# Patient Record
Sex: FEMALE | Race: WHITE | Employment: UNEMPLOYED | URBAN - NONMETROPOLITAN AREA
[De-identification: names, ages, dates, MRNs, and addresses within clinical notes are randomized per-mention and may not be internally consistent; named-entity substitution may affect disease eponyms.]

---
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
## 2020-01-04 ENCOUNTER — HOSPITAL ENCOUNTER (EMERGENCY)
Age: 20
Discharge: HOME | End: 2020-01-04
Attending: EMERGENCY MEDICINE
Payer: COMMERCIAL

## 2020-01-04 VITALS
OXYGEN SATURATION: 99 % | BODY MASS INDEX: 33.75 KG/M2 | SYSTOLIC BLOOD PRESSURE: 112 MMHG | WEIGHT: 210 LBS | HEIGHT: 66 IN | RESPIRATION RATE: 16 BRPM | HEART RATE: 81 BPM | TEMPERATURE: 98.4 F | DIASTOLIC BLOOD PRESSURE: 81 MMHG

## 2020-01-04 PROCEDURE — 99283 EMERGENCY DEPT VISIT LOW MDM: CPT

## 2020-01-04 RX ORDER — SULFAMETHOXAZOLE AND TRIMETHOPRIM 800; 160 MG/1; MG/1
1 TABLET ORAL 2 TIMES DAILY
Qty: 20 TABLET | Refills: 0 | Status: SHIPPED | OUTPATIENT
Start: 2020-01-04 | End: 2020-01-14

## 2020-01-04 SDOH — HEALTH STABILITY: MENTAL HEALTH: HOW OFTEN DO YOU HAVE A DRINK CONTAINING ALCOHOL?: NEVER

## 2020-01-04 ASSESSMENT — PAIN DESCRIPTION - DESCRIPTORS: DESCRIPTORS: SHARP;ACHING;DISCOMFORT

## 2020-01-04 ASSESSMENT — PAIN DESCRIPTION - ORIENTATION: ORIENTATION: LEFT

## 2020-01-04 ASSESSMENT — PAIN DESCRIPTION - LOCATION: LOCATION: PERINEUM

## 2020-01-04 ASSESSMENT — PAIN DESCRIPTION - PAIN TYPE: TYPE: ACUTE PAIN

## 2020-01-04 ASSESSMENT — PAIN DESCRIPTION - PROGRESSION: CLINICAL_PROGRESSION: GRADUALLY WORSENING

## 2020-01-04 ASSESSMENT — PAIN - FUNCTIONAL ASSESSMENT: PAIN_FUNCTIONAL_ASSESSMENT: PREVENTS OR INTERFERES SOME ACTIVE ACTIVITIES AND ADLS

## 2020-01-04 ASSESSMENT — PAIN DESCRIPTION - ONSET: ONSET: PROGRESSIVE

## 2020-01-04 ASSESSMENT — PAIN DESCRIPTION - FREQUENCY: FREQUENCY: INTERMITTENT

## 2020-01-04 ASSESSMENT — PAIN SCALES - GENERAL: PAINLEVEL_OUTOF10: 5

## 2020-01-04 NOTE — ED PROVIDER NOTES
Emergency Department Encounter  Location: [facility] [address] [place] [address]    Patient: Elias Monroe  MRN: [id number]  : 2000  Date of evaluation: 2020  ED Provider: Mary Gregory DO, FACEP    Chief Complaint:    Sore (Patient has had sore on left outer pubic area for the last two months. Pain has gotten worse in the last couple days. )    [place]:  Elias Monroe is a 20 y.o. female that presents to the emergency department with complaints of a knot in her left groin. The patient states there was a small knot that is been present there for about a month. Today she noticed that there was some irritation against her underwear. She noticed that now there is a little hole there where there used to be a knot. She states it has not drained out any material.  She states it is mildly painful at 5 out of 10. She denies any drainage from this area. She denies fever or chills or nausea or vomiting. ROS:  At least 4 systems reviewed and otherwise acutely negative except as in the [address] Past Medical History:   Diagnosis Date    Seasonal allergies      History reviewed. No pertinent surgical history. History reviewed. No pertinent family history.   Social History     Socioeconomic History    Marital status: Single     Spouse name: Not on file    Number of children: Not on file    Years of education: Not on file    Highest education level: Not on file   Occupational History    Not on file   Social Needs    Financial resource strain: Not on file    Food insecurity:     Worry: Not on file     Inability: Not on file    Transportation needs:     Medical: Not on file     Non-medical: Not on file   Tobacco Use    Smoking status: Never Smoker    Smokeless tobacco: Never Used   Substance and Sexual Activity    Alcohol use: Never     Frequency: Never    Drug use: Never    Sexual activity: Yes     Partners: Male   Lifestyle    Physical activity:     Days per week: Not on file     Minutes per session:

## 2020-11-29 ENCOUNTER — HOSPITAL ENCOUNTER (EMERGENCY)
Age: 20
Discharge: HOME | End: 2020-11-29
Attending: EMERGENCY MEDICINE
Payer: COMMERCIAL

## 2020-11-29 ENCOUNTER — APPOINTMENT (OUTPATIENT)
Dept: ULTRASOUND IMAGING | Age: 20
End: 2020-11-29
Payer: COMMERCIAL

## 2020-11-29 VITALS
WEIGHT: 190 LBS | BODY MASS INDEX: 31.65 KG/M2 | HEIGHT: 65 IN | SYSTOLIC BLOOD PRESSURE: 125 MMHG | HEART RATE: 78 BPM | TEMPERATURE: 98 F | OXYGEN SATURATION: 100 % | DIASTOLIC BLOOD PRESSURE: 84 MMHG | RESPIRATION RATE: 16 BRPM

## 2020-11-29 LAB
ALBUMIN SERPL-MCNC: 4.4 GM/DL (ref 3.4–5)
ALP BLD-CCNC: 77 IU/L (ref 40–129)
ALT SERPL-CCNC: 13 U/L (ref 10–40)
ANION GAP SERPL CALCULATED.3IONS-SCNC: 11 MMOL/L (ref 4–16)
AST SERPL-CCNC: 15 IU/L (ref 15–37)
BACTERIA: ABNORMAL /HPF
BASOPHILS ABSOLUTE: 0 K/CU MM
BASOPHILS RELATIVE PERCENT: 0.3 % (ref 0–1)
BILIRUB SERPL-MCNC: 0.4 MG/DL (ref 0–1)
BILIRUBIN URINE: NEGATIVE MG/DL
BLOOD, URINE: ABNORMAL
BUN BLDV-MCNC: 13 MG/DL (ref 6–23)
CALCIUM SERPL-MCNC: 9.2 MG/DL (ref 8.3–10.6)
CAST TYPE: NEGATIVE /HPF
CHLORIDE BLD-SCNC: 103 MMOL/L (ref 99–110)
CLARITY: ABNORMAL
CO2: 24 MMOL/L (ref 21–32)
COLOR: YELLOW
CREAT SERPL-MCNC: 0.8 MG/DL (ref 0.6–1.1)
CRYSTAL TYPE: NEGATIVE /HPF
DIFFERENTIAL TYPE: ABNORMAL
EOSINOPHILS ABSOLUTE: 0.1 K/CU MM
EOSINOPHILS RELATIVE PERCENT: 1 % (ref 0–3)
EPITHELIAL CELLS, UA: ABNORMAL /HPF
GFR AFRICAN AMERICAN: >60 ML/MIN/1.73M2
GFR NON-AFRICAN AMERICAN: >60 ML/MIN/1.73M2
GLUCOSE BLD-MCNC: 122 MG/DL (ref 70–99)
GLUCOSE, URINE: NEGATIVE MG/DL
GONADOTROPIN, CHORIONIC (HCG) QUANT: 102.9 UIU/ML
HCT VFR BLD CALC: 38.2 % (ref 37–47)
HEMOGLOBIN: 12.5 GM/DL (ref 12.5–16)
IMMATURE NEUTROPHIL %: 0.4 % (ref 0–0.43)
INTERPRETATION: ABNORMAL
KETONES, URINE: NEGATIVE MG/DL
LEUKOCYTE ESTERASE, URINE: NEGATIVE
LIPASE: 16 IU/L (ref 13–60)
LYMPHOCYTES ABSOLUTE: 2.5 K/CU MM
LYMPHOCYTES RELATIVE PERCENT: 26.5 % (ref 24–44)
MCH RBC QN AUTO: 29.6 PG (ref 27–31)
MCHC RBC AUTO-ENTMCNC: 32.7 % (ref 32–36)
MCV RBC AUTO: 90.5 FL (ref 78–100)
MONOCYTES ABSOLUTE: 0.4 K/CU MM
MONOCYTES RELATIVE PERCENT: 4.5 % (ref 0–4)
NITRITE URINE, QUANTITATIVE: NEGATIVE
PDW BLD-RTO: 12.6 % (ref 11.7–14.9)
PH, URINE: 5.5 (ref 5–8)
PLATELET # BLD: 357 K/CU MM (ref 140–440)
PMV BLD AUTO: 9.5 FL (ref 7.5–11.1)
POTASSIUM SERPL-SCNC: 3.8 MMOL/L (ref 3.5–5.1)
PREGNANCY, URINE: POSITIVE
PROTEIN UA: NEGATIVE MG/DL
RBC # BLD: 4.22 M/CU MM (ref 4.2–5.4)
RBC URINE: ABNORMAL /HPF (ref 0–6)
SEGMENTED NEUTROPHILS ABSOLUTE COUNT: 6.3 K/CU MM
SEGMENTED NEUTROPHILS RELATIVE PERCENT: 67.3 % (ref 36–66)
SODIUM BLD-SCNC: 138 MMOL/L (ref 135–145)
SPECIFIC GRAVITY UA: 1.03 (ref 1–1.03)
SPECIFIC GRAVITY, URINE: 1.03 (ref 1–1.03)
TOTAL IMMATURE NEUTOROPHIL: 0.04 K/CU MM
TOTAL PROTEIN: 7 GM/DL (ref 6.4–8.2)
UROBILINOGEN, URINE: 0.2 MG/DL (ref 0.2–1)
WBC # BLD: 9.3 K/CU MM (ref 4–10.5)
WBC UA: NEGATIVE /HPF (ref 0–5)

## 2020-11-29 PROCEDURE — 80053 COMPREHEN METABOLIC PANEL: CPT

## 2020-11-29 PROCEDURE — 84702 CHORIONIC GONADOTROPIN TEST: CPT

## 2020-11-29 PROCEDURE — 93975 VASCULAR STUDY: CPT

## 2020-11-29 PROCEDURE — 81001 URINALYSIS AUTO W/SCOPE: CPT

## 2020-11-29 PROCEDURE — 81025 URINE PREGNANCY TEST: CPT

## 2020-11-29 PROCEDURE — 99283 EMERGENCY DEPT VISIT LOW MDM: CPT

## 2020-11-29 PROCEDURE — 76817 TRANSVAGINAL US OBSTETRIC: CPT

## 2020-11-29 PROCEDURE — 85025 COMPLETE CBC W/AUTO DIFF WBC: CPT

## 2020-11-29 PROCEDURE — 83690 ASSAY OF LIPASE: CPT

## 2020-11-29 ASSESSMENT — PAIN DESCRIPTION - ORIENTATION: ORIENTATION: LOWER;LEFT

## 2020-11-29 ASSESSMENT — PAIN DESCRIPTION - LOCATION: LOCATION: ABDOMEN

## 2020-11-29 ASSESSMENT — PAIN SCALES - GENERAL: PAINLEVEL_OUTOF10: 10

## 2020-11-29 ASSESSMENT — PAIN DESCRIPTION - PAIN TYPE: TYPE: ACUTE PAIN

## 2020-11-29 ASSESSMENT — PAIN DESCRIPTION - DESCRIPTORS: DESCRIPTORS: CRAMPING

## 2020-11-29 NOTE — ED NOTES
Patient to [address] via wheelchair.      Kendall Jonesster, RN  11/29/20 7338

## 2020-11-29 NOTE — ED NOTES
This nurse to resume care of patient. Patient currently waiting on [facility] to arrive to complete US. Patient laying on right fanny in bed resting, states she is still having cramping on her left side, unchanged since arriving to the ED. Patient provided a warm blanket, no other concerns or needs expressed at this time.      Iain Villarreal RN  11/29/20 5519

## 2020-11-29 NOTE — ED PROVIDER NOTES
Emergency Department Encounter    Patient: Fauzia Duffy  MRN: [id number]  : 2000  Date of Evaluation: 2020  ED Provider:  LIBERTY BOB    Triage Chief Complaint:   Abdominal Pain (Patient states that she woke up this morning vomiting. She states that she has had abdominal pain in her lower left adomen. She states that her and her  have been trying to get pregnant and there is some blood when she wipes. States that she just got off her period 4 days ago. )      [place]:  Fauzia Duffy is a 23 y.o. female that presents to the emergency department with left lower quadrant abdominal pain beginning around 5 AM today. This is cramping in quality and felt like \"gas. \"  Pain has waxed and waned since then. She began to feel very nauseated and did vomit. She has noted a little blood when she wipes but denies any dysuria or visible hematuria otherwise. She denies diarrhea or constipation. Her LMP ended 4 days ago, but she and her significant other are \"trying\" to get pregnant. Patient denies other upper respiratory symptoms or fevers. Patient is currently  1 para 1 with an approximate 25month-old daughter at home. Patient reports no other particular provocative or alleviating factors. ROS - see HPI, below listed is current ROS at time of my eval:  CONSTITUTIONAL: No fevers, chills, or sweats. EYES: No vision change, redness, drainage, or discharge. HENT: No sore throat, runny nose, or earache. No dental pain. No painful swallowing. RESPIRATORY: No difficulty breathing, cough, or sputum production. CARDIOVASCULAR: No anginal-type chest pain, orthopnea, or edema. GASTROINTESTINAL: No diarrhea or constipation. No hematemesis, hematochezia, or melena. GENITOURINARY: No frequency, urgency, or dysuria. No hematuria. MUSCULOSKELETAL: No recent injury. No neck, back, or extremity pain. NEUROLOGICAL: No focal weakness, numbness, or tingling. SKIN: No rashes or other lesions reported.   No Peak Flow       Pain Score       Pain Loc       Pain Edu? Excl. in [address]? My pulse ox interpretation is - normal    GENERAL: Patient is awake, alert, and oriented appropriately. Patient is resting comfortably in a still position on the exam table. Patient speaking in full and complete sentences. Well-nourished and well-developed. HEENT: Normocephalic and atraumatic. No midface, zygomatic, maxillary, or mandibular tenderness. No dental malocclusion. Bilateral external ears are unremarkable. Tympanic membranes are pearly and gray without visible effusion or retraction. Nasal mucosa is pink without purulence. Oral mucosa is moist and pink. There is no significant tonsillar enlargement or exudate. Uvula midline. There is no elevation of the tongue or pooling of secretions. NECK: Supple without Kernig's or Brudzinski signs. No significant lymphadenopathy or limitation range of motion. No midline spinal tenderness. RESPIRATORY: Symmetric aeration bilaterally. No audible wheezes, rales, rhonchi, or stridor. No chest wall tenderness. CARDIOVASCULAR: Regular rate and rhythm. No audible murmurs, rubs, or gallops. No central or peripheral cyanosis. GASTROINTESTINAL: Soft, nontender, and nondistended. No McBurney's or Tim's point tenderness. No guarding, rebound, rigidity. No mass or pulsatile mass. Bowel sounds are present in all quadrants. No costovertebral angle tenderness. NEUROLOGICAL: Cranial nerves III through XII are grossly intact as tested without facial droop or dermatomal paresthesias. Of note, forehead wrinkles are symmetric and intact. Conjugate gaze without entrapment. No asymmetry of the corners of the mouth or nasolabial folds. No gross motor or cerebellar deficits. MUSCULOSKELETAL: No asymmetric edema, Homans' sign, or cords. SKIN: Normal tone for ethnicity. Normal turgor and brisk capillary refill peripherally.   No petechiae, purpura, vesicles, bullae, or other lesions. No icterus. PSYCHIATRIC: Normal mood. Normal affect. No voiced suicidal or homicidal ideation. Patient does not respond to internal stimuli. Emergency department course. Patient is brought to bed 3 and assessed and reassessed by me. Prior to initial evaluation, initial orders are placed for CBC, metabolic panel, lipase, and urinalysis with pregnancy. After initial evaluation, screening studies are pending. We will likely need ultrasound or CT scan based upon the results of the pregnancy test.  Abdomen is nonsurgical at this time. Patient is agreeable to continuing plan. As of approximately , patient is notified of the positive pregnancy test.  A quantitative hCG and ultrasound of the pelvis are ordered. Patient is agreeable to the additional testing. Upon most recent reevaluation, patient remains clinically stable. Abdomen has been nonsurgical.  Quantitative hCG is fairly low at 102.9, and pelvic ultrasound does not show detectable products of conception. Fortunately, there is no evidence of ectopic or heterotopic pregnancy. We have discussed that this may be due to a very early gestation. As such, we have discussed repeat quantitative hCG level in about 48 hours and careful follow-up with her OB/GYN. We have discussed the possibility of miscarriage as possible explanations for the low level and ultrasound findings, as well. Evaluation does not suggest high risk of appendicitis, cholecystitis, torsion, obstructive uropathy, pyelonephritis, or other septic process. Patient is advised to continue a daily prenatal vitamin and to avoid NSAIDs. We have discussed all available results. Patient is satisfied with evaluation and agreeable to recommendations. Patient has had the opportunity to ask questions, and they have been answered to the best of my ability. Instructions are given to follow-up with primary care provider for reevaluation and further testing.   Very MM    Monocytes Absolute 0.4 K/CU MM    Eosinophils Absolute 0.1 K/CU MM    Basophils Absolute 0.0 K/CU MM    Immature Neutrophil % 0.4 0 - 0.43 %    Total Immature Neutrophil 0.04 K/CU MM   Comprehensive Metabolic Panel w/ Reflex to MG   Result Value Ref Range    Sodium 138 135 - 145 MMOL/L    Potassium 3.8 3.5 - 5.1 MMOL/L    Chloride 103 99 - 110 mMol/L    CO2 24 21 - 32 MMOL/L    BUN 13 6 - 23 MG/DL    CREATININE 0.8 0.6 - 1.1 MG/DL    Glucose 122 (H) 70 - 99 MG/DL    Calcium 9.2 8.3 - 10.6 MG/DL    Alb 4.4 3.4 - 5.0 GM/DL    Total Protein 7.0 6.4 - 8.2 GM/DL    Total Bilirubin 0.4 0.0 - 1.0 MG/DL    ALT 13 10 - 40 U/L    AST 15 15 - 37 IU/L    Alkaline Phosphatase 77 40 - 129 IU/L    GFR Non-African American >60 >60 mL/min/1.73m2    GFR African American >60 >60 mL/min/1.73m2    Anion Gap 11 4 - 16   Lipase   Result Value Ref Range    Lipase 16 13 - 60 IU/L   HCG, Quantitative, Pregnancy   Result Value Ref Range    hCG Quant 102.9 UIU/ML        Radiographs (if obtained):  Radiologist's Report Reviewed:  Us Ob Transvaginal    Result Date: 11/29/2020  EXAMINATION: FIRST TRIMESTER OBSTETRIC ULTRASOUND; DOPPLER EVALUATION OF THE PELVIS 11/29/2020 TECHNIQUE: Transvaginal first trimester obstetric pelvic ultrasound was performed with color Doppler flow evaluation.; DOPPLER ULTRASOUND OF THE PELVIS COMPARISON: None HISTORY: ORDERING SYSTEM PROVIDED HISTORY: Positive urine pregnancy test, left lower quadrant and pelvic pain, rule out ectopic TECHNOLOGIST PROVIDED HISTORY: Reason for exam:->Positive urine pregnancy test, left lower quadrant and pelvic pain, rule out ectopic Reason for Exam: + urine preg. test, LLQ pain, r/o ectopic Type of Exam: Initial Beta hCG 102.9 FINDINGS: Uterus: 7.3 x 5.7 x 4.1 cm. Endometrium measures up to 14 mm Gestational Sac(s):  Not visualized. Yolk Sac:  Not visualized Fetal Pole:  Not visualized Right ovary: 3.5 x 2.5 x 2.4 cm. Normal arterial and venous Doppler flow.  Left ovary: 2.7 x 1.9 x 2.5 cm normal arterial and venous Doppler flow. Free fluid: No significant free fluid. No intrauterine pregnancy is identified. Differential considerations continue to include early IUP, failed 1st trimester pregnancy or ectopic pregnancy. Recommend continued close clinical follow-up as well as correlation with serial beta HCGs. Normal appearing ovaries. Us Dup Abd Pel Retro Scrot Complete    Result Date: 11/29/2020  EXAMINATION: FIRST TRIMESTER OBSTETRIC ULTRASOUND; DOPPLER EVALUATION OF THE PELVIS 11/29/2020 TECHNIQUE: Transvaginal first trimester obstetric pelvic ultrasound was performed with color Doppler flow evaluation.; DOPPLER ULTRASOUND OF THE PELVIS COMPARISON: None HISTORY: ORDERING SYSTEM PROVIDED HISTORY: Positive urine pregnancy test, left lower quadrant and pelvic pain, rule out ectopic TECHNOLOGIST PROVIDED HISTORY: Reason for exam:->Positive urine pregnancy test, left lower quadrant and pelvic pain, rule out ectopic Reason for Exam: + urine preg. test, LLQ pain, r/o ectopic Type of Exam: Initial Beta hCG 102.9 FINDINGS: Uterus: 7.3 x 5.7 x 4.1 cm. Endometrium measures up to 14 mm Gestational Sac(s):  Not visualized. Yolk Sac:  Not visualized Fetal Pole:  Not visualized Right ovary: 3.5 x 2.5 x 2.4 cm. Normal arterial and venous Doppler flow. Left ovary: 2.7 x 1.9 x 2.5 cm normal arterial and venous Doppler flow. Free fluid: No significant free fluid. No intrauterine pregnancy is identified. Differential considerations continue to include early IUP, failed 1st trimester pregnancy or ectopic pregnancy. Recommend continued close clinical follow-up as well as correlation with serial beta HCGs. Normal appearing ovaries. Medical decision making:  Presents to the emergency department for evaluation of left pelvic pain. She denies any bleeding or discharge at this time. Due to the extreme early gestation lack of bleeding at this time, we will defer RhoGam treatment.   Abdomen does not suggest appendicitis, cholecystitis, perforation, ischemia, aortic catastrophe, or other surgical emergency. There has been no intractable vomiting or fever. Patient appears well-hydrated and nontoxic. Procedures: None. Consultations: None. Clinical Impression:  1. First trimester pregnancy    2. Acute pelvic pain, female      Disposition referral (if applicable): MD Dinora Fitzpatrick  [phone]    [facility] Emergency Department  [address] [facility]  [phone]        Disposition medications (if applicable): There are no discharge medications for this patient. ED Provider Disposition Time  DISPOSITION Decision To Discharge 11/29/2020 04:04:24 PM      Comment: Please note this report has been produced using speech recognition software and may contain errors related to that system including errors in grammar, punctuation, and spelling, as well as words and phrases that may be inappropriate. Efforts were made to edit the dictations.         Tru Verma MD  11/29/20 2630

## 2020-11-29 NOTE — ED NOTES
Patient returned from [address] [place] [address] [address]. Warm blankets provided.      Cameron Alcala, KENZIE  11/29/20 6560

## 2020-12-01 ENCOUNTER — HOSPITAL ENCOUNTER (OUTPATIENT)
Age: 20
Discharge: HOME | End: 2020-12-01
Payer: COMMERCIAL

## 2020-12-01 LAB — GONADOTROPIN, CHORIONIC (HCG) QUANT: 45.2 UIU/ML

## 2020-12-01 PROCEDURE — 84702 CHORIONIC GONADOTROPIN TEST: CPT

## 2020-12-01 PROCEDURE — 36415 COLL VENOUS BLD VENIPUNCTURE: CPT

## 2020-12-07 ENCOUNTER — HOSPITAL ENCOUNTER (OUTPATIENT)
Age: 20
Discharge: HOME | End: 2020-12-07
Payer: COMMERCIAL

## 2020-12-07 LAB — GONADOTROPIN, CHORIONIC (HCG) QUANT: 1.7 UIU/ML

## 2020-12-07 PROCEDURE — 36415 COLL VENOUS BLD VENIPUNCTURE: CPT

## 2020-12-07 PROCEDURE — 84702 CHORIONIC GONADOTROPIN TEST: CPT

## 2022-06-27 ENCOUNTER — HOSPITAL ENCOUNTER (EMERGENCY)
Age: 22
Discharge: HOME | End: 2022-06-27
Attending: EMERGENCY MEDICINE
Payer: COMMERCIAL

## 2022-06-27 VITALS
WEIGHT: 220 LBS | BODY MASS INDEX: 35.36 KG/M2 | HEIGHT: 66 IN | HEART RATE: 78 BPM | SYSTOLIC BLOOD PRESSURE: 132 MMHG | DIASTOLIC BLOOD PRESSURE: 85 MMHG | OXYGEN SATURATION: 96 % | TEMPERATURE: 97.3 F | RESPIRATION RATE: 16 BRPM

## 2022-06-27 DIAGNOSIS — M43.6 TORTICOLLIS: Primary | ICD-10-CM

## 2022-06-27 PROCEDURE — 6370000000 HC RX 637 (ALT 250 FOR IP): Performed by: EMERGENCY MEDICINE

## 2022-06-27 PROCEDURE — 99283 EMERGENCY DEPT VISIT LOW MDM: CPT

## 2022-06-27 RX ORDER — NAPROXEN 500 MG/1
500 TABLET ORAL ONCE
Status: COMPLETED | OUTPATIENT
Start: 2022-06-27 | End: 2022-06-27

## 2022-06-27 RX ADMIN — NAPROXEN 500 MG: 500 TABLET ORAL at 09:47

## 2022-06-27 ASSESSMENT — PAIN SCALES - GENERAL
PAINLEVEL_OUTOF10: 8
PAINLEVEL_OUTOF10: 8

## 2022-06-27 ASSESSMENT — PAIN DESCRIPTION - LOCATION: LOCATION: NECK

## 2022-06-27 ASSESSMENT — PAIN - FUNCTIONAL ASSESSMENT: PAIN_FUNCTIONAL_ASSESSMENT: 0-10

## 2022-06-27 ASSESSMENT — ENCOUNTER SYMPTOMS: BACK PAIN: 0

## 2022-06-27 ASSESSMENT — PAIN DESCRIPTION - ORIENTATION: ORIENTATION: LEFT

## 2022-06-27 NOTE — ED NOTES
Pt ambulated with strong steady gait to er room 4. Pt c/o left sided neck pain after hearing/feeling a pop there when going from sitting on floor to standing position today.  Pt has pain and decreased ROM     Simón Maloney [facility]  06/27/22 9396

## 2022-06-27 NOTE — ED PROVIDER NOTES
Triage Chief Complaint:   Neck Pain (going from sitting to standing this morning and felt a pop left side of neck, pain immediately after and decreased ROM )    [place]:  Anitha Pak is a 20 y.o. female that presents to the ED with complaint of painless side of her neck. She felt like something pop when she was holding her 3month-old baby and then move from sitting to standing. She is having no radiation. Her pain is moderate constant worse with movement. No high risk features red flags denies any falls injury trauma no fevers chills no problem breathing or swallowing        Past Medical History:   Diagnosis Date    Seasonal allergies      History reviewed. No pertinent surgical history. History reviewed. No pertinent family history. Social History     Socioeconomic History    Marital status:      Spouse name: Not on file    Number of children: Not on file    Years of education: Not on file    Highest education level: Not on file   Occupational History    Not on file   Tobacco Use    Smoking status: Never Smoker    Smokeless tobacco: Never Used   Vaping Use    Vaping Use: Never used   Substance and Sexual Activity    Alcohol use: Yes     Comment: Occasionally     Drug use: Never    Sexual activity: Yes     Partners: Male   Other Topics Concern    Not on file   Social History Narrative    Not on file     Social Determinants of Health     Financial Resource Strain:     Difficulty of Paying Living Expenses: Not on file   Food Insecurity:     Worried About Running Out of Food in the Last Year: Not on file    Josiane of Food in the Last Year: Not on file   Transportation Needs:     Lack of Transportation (Medical): Not on file    Lack of Transportation (Non-Medical):  Not on file   Physical Activity:     Days of Exercise per Week: Not on file    Minutes of Exercise per Session: Not on file   Stress:     Feeling of Stress : Not on file   Social Connections:     Frequency of Communication Mental Status: She is alert and oriented to person, place, and time. I have reviewed and interpreted all of the currently available lab results from this visit (ifapplicable):  No results found for this visit on 06/27/22. Radiographs (if obtained):  [] The following radiograph wasinterpreted by myself in the absence of a radiologist:   [] Radiologist's Report Reviewed:  No orders to display         EKG (if obtained): (All EKG's are interpreted by myself in the absence of a cardiologist)    Chart review shows recent radiographs:  No results found. MDM:    Patient presented with acute torticollis. There is no reflux up with the intact sensation boat. Patient underwent OMT with improvement. Trial of Naprosyn cold packs were recommended        Osteopathic Manipulative Medicine (OMM)  Cervical Contralateral Traction, Supine    PROCEDURE  With the patient lying supine on the cot and myself standing at the side of the cot opposite of the side to be treated, my caudad hand reached over and around the neck to touch with the pads of the fingers on the patient's cervical paravertebral musculature on the opposite side. My cephalad hand laid on the patient's forehead to stabilize the head. Keeping the caudad arm straight, I gently catherine the paravertebral muscles ventrally producing minimal extension of the cervical spine. This technique was performed in a gentle rhythmic and kneading fashion for 30 seconds at 2-minute intervals. Tissue tension was reevaluated following the technique on both sides. The cervical paraspinal muscular tension was noted to be improved. The patient tolerated the procedure well. Clinical Impression:  1.  Torticollis      Disposition referral (if applicable):  Nasir Leavitt MD  [address] [place] [id number] [phone]    Go in 1 week      Disposition medications (if applicable):  New Prescriptions    No medications on file           GREGORIA Jolanta Alcala DO, FACEP      Comment: Please note this report has been produced using speech recognition software and maycontain errors related to that system including errors in grammar, punctuation, and spelling, as well as words and phrases that may be inappropriate. If there are any questions or concerns please feel free to contact thedictating provider for clarification.         Eren Loaiza DO  06/27/22 5671

## 2025-01-31 ENCOUNTER — TELEPHONE (OUTPATIENT)
Dept: CARDIOLOGY CLINIC | Age: 25
End: 2025-01-31

## 2025-01-31 NOTE — TELEPHONE ENCOUNTER
Patient referral sent here for 24 hour holter monitor by PCP Migel Tilley, called and left VM to call back to schedule.